# Patient Record
Sex: FEMALE | Race: WHITE | Employment: FULL TIME | URBAN - METROPOLITAN AREA
[De-identification: names, ages, dates, MRNs, and addresses within clinical notes are randomized per-mention and may not be internally consistent; named-entity substitution may affect disease eponyms.]

---

## 2018-12-14 ENCOUNTER — APPOINTMENT (OUTPATIENT)
Dept: ULTRASOUND IMAGING | Facility: CLINIC | Age: 28
End: 2018-12-14
Attending: EMERGENCY MEDICINE
Payer: COMMERCIAL

## 2018-12-14 ENCOUNTER — HOSPITAL ENCOUNTER (EMERGENCY)
Facility: CLINIC | Age: 28
Discharge: HOME | End: 2018-12-14
Attending: EMERGENCY MEDICINE | Admitting: EMERGENCY MEDICINE
Payer: COMMERCIAL

## 2018-12-14 ENCOUNTER — APPOINTMENT (OUTPATIENT)
Dept: CT IMAGING | Facility: CLINIC | Age: 28
End: 2018-12-14
Attending: EMERGENCY MEDICINE
Payer: COMMERCIAL

## 2018-12-14 VITALS
TEMPERATURE: 98 F | SYSTOLIC BLOOD PRESSURE: 123 MMHG | DIASTOLIC BLOOD PRESSURE: 78 MMHG | OXYGEN SATURATION: 100 % | RESPIRATION RATE: 16 BRPM | HEART RATE: 97 BPM | WEIGHT: 284 LBS

## 2018-12-14 DIAGNOSIS — K80.20 CALCULUS OF GALLBLADDER WITHOUT CHOLECYSTITIS WITHOUT OBSTRUCTION: ICD-10-CM

## 2018-12-14 DIAGNOSIS — R10.13 ABDOMINAL PAIN, EPIGASTRIC: ICD-10-CM

## 2018-12-14 LAB
ALBUMIN SERPL-MCNC: 3.5 G/DL (ref 3.4–5)
ALP SERPL-CCNC: 96 U/L (ref 40–150)
ALT SERPL W P-5'-P-CCNC: 33 U/L (ref 0–50)
ANION GAP SERPL CALCULATED.3IONS-SCNC: 6 MMOL/L (ref 3–14)
AST SERPL W P-5'-P-CCNC: 17 U/L (ref 0–45)
BASOPHILS # BLD AUTO: 0.1 10E9/L (ref 0–0.2)
BASOPHILS NFR BLD AUTO: 0.5 %
BILIRUB SERPL-MCNC: 0.2 MG/DL (ref 0.2–1.3)
BUN SERPL-MCNC: 10 MG/DL (ref 7–30)
CALCIUM SERPL-MCNC: 8.9 MG/DL (ref 8.5–10.1)
CHLORIDE SERPL-SCNC: 105 MMOL/L (ref 94–109)
CO2 SERPL-SCNC: 27 MMOL/L (ref 20–32)
CREAT SERPL-MCNC: 0.79 MG/DL (ref 0.52–1.04)
DIFFERENTIAL METHOD BLD: ABNORMAL
EOSINOPHIL # BLD AUTO: 0.1 10E9/L (ref 0–0.7)
EOSINOPHIL NFR BLD AUTO: 0.9 %
ERYTHROCYTE [DISTWIDTH] IN BLOOD BY AUTOMATED COUNT: 13.6 % (ref 10–15)
GFR SERPL CREATININE-BSD FRML MDRD: 86 ML/MIN/1.7M2
GLUCOSE SERPL-MCNC: 88 MG/DL (ref 70–99)
HCG SERPL QL: NEGATIVE
HCT VFR BLD AUTO: 43 % (ref 35–47)
HGB BLD-MCNC: 14.1 G/DL (ref 11.7–15.7)
IMM GRANULOCYTES # BLD: 0 10E9/L (ref 0–0.4)
IMM GRANULOCYTES NFR BLD: 0.3 %
LIPASE SERPL-CCNC: 222 U/L (ref 73–393)
LYMPHOCYTES # BLD AUTO: 3.7 10E9/L (ref 0.8–5.3)
LYMPHOCYTES NFR BLD AUTO: 30.2 %
MCH RBC QN AUTO: 27.9 PG (ref 26.5–33)
MCHC RBC AUTO-ENTMCNC: 32.8 G/DL (ref 31.5–36.5)
MCV RBC AUTO: 85 FL (ref 78–100)
MONOCYTES # BLD AUTO: 0.7 10E9/L (ref 0–1.3)
MONOCYTES NFR BLD AUTO: 5.8 %
NEUTROPHILS # BLD AUTO: 7.6 10E9/L (ref 1.6–8.3)
NEUTROPHILS NFR BLD AUTO: 62.3 %
NRBC # BLD AUTO: 0 10*3/UL
NRBC BLD AUTO-RTO: 0 /100
PLATELET # BLD AUTO: 386 10E9/L (ref 150–450)
POTASSIUM SERPL-SCNC: 3.9 MMOL/L (ref 3.4–5.3)
PROT SERPL-MCNC: 7.7 G/DL (ref 6.8–8.8)
RBC # BLD AUTO: 5.05 10E12/L (ref 3.8–5.2)
SODIUM SERPL-SCNC: 138 MMOL/L (ref 133–144)
WBC # BLD AUTO: 12.2 10E9/L (ref 4–11)

## 2018-12-14 PROCEDURE — 74177 CT ABD & PELVIS W/CONTRAST: CPT

## 2018-12-14 PROCEDURE — 25000128 H RX IP 250 OP 636: Performed by: EMERGENCY MEDICINE

## 2018-12-14 PROCEDURE — 96376 TX/PRO/DX INJ SAME DRUG ADON: CPT

## 2018-12-14 PROCEDURE — 80053 COMPREHEN METABOLIC PANEL: CPT | Performed by: EMERGENCY MEDICINE

## 2018-12-14 PROCEDURE — 96375 TX/PRO/DX INJ NEW DRUG ADDON: CPT

## 2018-12-14 PROCEDURE — 96374 THER/PROPH/DIAG INJ IV PUSH: CPT | Mod: 59

## 2018-12-14 PROCEDURE — 84703 CHORIONIC GONADOTROPIN ASSAY: CPT | Performed by: EMERGENCY MEDICINE

## 2018-12-14 PROCEDURE — 76705 ECHO EXAM OF ABDOMEN: CPT

## 2018-12-14 PROCEDURE — 85025 COMPLETE CBC W/AUTO DIFF WBC: CPT | Performed by: EMERGENCY MEDICINE

## 2018-12-14 PROCEDURE — 99285 EMERGENCY DEPT VISIT HI MDM: CPT | Mod: 25

## 2018-12-14 PROCEDURE — 83690 ASSAY OF LIPASE: CPT | Performed by: EMERGENCY MEDICINE

## 2018-12-14 PROCEDURE — 96361 HYDRATE IV INFUSION ADD-ON: CPT

## 2018-12-14 RX ORDER — SODIUM CHLORIDE 9 MG/ML
1000 INJECTION, SOLUTION INTRAVENOUS CONTINUOUS
Status: DISCONTINUED | OUTPATIENT
Start: 2018-12-14 | End: 2018-12-14 | Stop reason: HOSPADM

## 2018-12-14 RX ORDER — ONDANSETRON 2 MG/ML
4 INJECTION INTRAMUSCULAR; INTRAVENOUS EVERY 30 MIN PRN
Status: DISCONTINUED | OUTPATIENT
Start: 2018-12-14 | End: 2018-12-14 | Stop reason: HOSPADM

## 2018-12-14 RX ORDER — IOPAMIDOL 755 MG/ML
100 INJECTION, SOLUTION INTRAVASCULAR ONCE
Status: COMPLETED | OUTPATIENT
Start: 2018-12-14 | End: 2018-12-14

## 2018-12-14 RX ORDER — HYDROCODONE BITARTRATE AND ACETAMINOPHEN 5; 325 MG/1; MG/1
1 TABLET ORAL EVERY 6 HOURS PRN
Qty: 18 TABLET | Refills: 0 | Status: SHIPPED | OUTPATIENT
Start: 2018-12-14 | End: 2018-12-17

## 2018-12-14 RX ORDER — ONDANSETRON 4 MG/1
4 TABLET, ORALLY DISINTEGRATING ORAL EVERY 6 HOURS PRN
Qty: 10 TABLET | Refills: 0 | Status: SHIPPED | OUTPATIENT
Start: 2018-12-14 | End: 2018-12-17

## 2018-12-14 RX ORDER — HYDROMORPHONE HYDROCHLORIDE 1 MG/ML
0.5 INJECTION, SOLUTION INTRAMUSCULAR; INTRAVENOUS; SUBCUTANEOUS
Status: DISCONTINUED | OUTPATIENT
Start: 2018-12-14 | End: 2018-12-14 | Stop reason: HOSPADM

## 2018-12-14 RX ADMIN — IOPAMIDOL 100 ML: 755 INJECTION, SOLUTION INTRAVENOUS at 12:43

## 2018-12-14 RX ADMIN — SODIUM CHLORIDE 1000 ML: 9 INJECTION, SOLUTION INTRAVENOUS at 10:44

## 2018-12-14 RX ADMIN — SODIUM CHLORIDE, PRESERVATIVE FREE 65 ML: 5 INJECTION INTRAVENOUS at 12:44

## 2018-12-14 RX ADMIN — Medication 0.5 MG: at 11:45

## 2018-12-14 RX ADMIN — Medication 0.5 MG: at 10:46

## 2018-12-14 RX ADMIN — ONDANSETRON 4 MG: 2 INJECTION INTRAMUSCULAR; INTRAVENOUS at 10:46

## 2018-12-14 SDOH — HEALTH STABILITY: MENTAL HEALTH: HOW OFTEN DO YOU HAVE A DRINK CONTAINING ALCOHOL?: NEVER

## 2018-12-14 ASSESSMENT — ENCOUNTER SYMPTOMS
FATIGUE: 1
HEMATURIA: 0
CONSTIPATION: 0
VOMITING: 1
BLOOD IN STOOL: 0
ABDOMINAL PAIN: 1
DYSURIA: 0
DIARRHEA: 0
NAUSEA: 1
DIFFICULTY URINATING: 0

## 2018-12-14 NOTE — ED PROVIDER NOTES
"  History     Chief Complaint:  Abdominal pain, nausea, vomiting    HPI   Nena Ca is a 27 year old female who presents with abdominal pain, nausea, and vomiting. The patient states that for the last 10 years she has had intermittent episodes of abdominal pain and vomiting following eating and these episodes usually last a couple weeks at a time. She has mentioned these issues to her PCP before, and she was told she may have a gastric ulcer, however no formal testing to investigate the issue has never been performed. The patient states that for the last two weeks she has been experiencing a similar episode. She states that it started as feeling nauseated after eating, and then in the last week eating has triggered abdominal pain and vomiting. The pain is usually in the center of her abdomen and has been constant the past two days. The pain does not radiate to her back or chest. She notes that \"heavy\" foods like potatoes bring on the most severe pain. Here in the ED, the pain seems to be on the left side of her abdomen. She reports that she usually starts vomiting 15 minutes after trying to eat and then she vomits several times afterwards for about the next 20 minutes. The patient has noted blood tinged vomit. She reports that she has lost about 20 pounds in the last few weeks because of low food intake and that she feels fatigued as well. The patient denies any constipation, diarrhea, black or bloody stool, urinary issues, or menstrual irregularities. Her last period was two weeks ago. The patient notes that she was told by her PCP that her abdominal pain and vomiting issues have never been charted before. She has never had an EGD performed and has an ultrasound scheduled for next week.     Allergies:  Amoxicillin  Penicillins  Sulfa Drugs  Trimethaphan      Medications:    The patient is currently on no regular medications.    Past Medical History:    The patient denies any significant past medical " history.    Past Surgical History:    The patient does not have any pertinent past surgical history.    Family History:    Cancer    Social History:  Patient presents with friend  Negative for tobacco use.  Negative for alcohol use.     Review of Systems   Constitutional: Positive for fatigue.   Gastrointestinal: Positive for abdominal pain, nausea and vomiting. Negative for blood in stool, constipation and diarrhea.   Genitourinary: Negative for decreased urine volume, difficulty urinating, dysuria, hematuria and menstrual problem.   All other systems reviewed and are negative.      Physical Exam   First Vitals:  BP: (!) 161/102  Pulse: 97  Heart Rate: 94  Temp: 98  F (36.7  C)  Resp: 16  Weight: 128.8 kg (284 lb)  SpO2: 98 %      Physical Exam  Constitutional:  Appears well-developed and well-nourished. Alert. Conversant. Non toxic.  HENT:   Head: Atraumatic.   Nose: Nose normal.  Mouth/Throat: Oral mucosa is clear and moist. no trismus. Pharynx normal. Tonsils symmetric. No tonsillar enlargement, erythema, or exudate.  Eyes: Conjunctivae normal. EOM normal. Pupils equal, round, and reactive to light. No scleral icterus.   Neck: Normal range of motion. Neck supple. No tracheal deviation present.   Cardiovascular: Normal rate, regular rhythm. No gallop. No friction rub. No murmur heard. Symmetric radial artery pulses   Pulmonary/Chest: Effort normal. No stridor. No respiratory distress. No wheezes. No rales. No rhonchi . No tenderness.   Abdominal: Soft. Bowel sounds normal. No distension. No mass.  Epigastric and left upper quadrant> right upper quadrant tenderness. No rebound. No guarding. No CVA tenderness.  No lower abdominal tenderness.  Musculoskeletal:   RUE: Normal range of motion. No tenderness. No deformity  LUE: Normal range of motion. No tenderness. No deformity  RLE: Normal range of motion. No edema. No tenderness. No deformity  LLE: Normal range of motion. No edema. No tenderness. No deformity  Lymph:  No cervical adenopathy.   Neurological: Alert and oriented to person, place, and time. Normal strength. CN II-VII intact. No sensory deficit. GCS eye subscore is 4. GCS verbal subscore is 5. GCS motor subscore is 6. Normal coordination   Skin: Skin is warm and dry. No rash noted. No pallor. Normal capillary refill.  Psychiatric:  Normal mood. Normal affect.     Emergency Department Course   Imaging:  Radiographic findings were communicated with the patient who voiced understanding of the findings.  US Abdomen limited RUQ  Cholelithiasis.  Per radiology    CT Abdomen/Pelvis with IV contrast:   1. No acute inflammatory process identified.   2. Cholelithiasis.  3. Mild splenomegaly.  4. 4.2 cm lower pelvic cystic lesion, which may represent an exophytic  ovarian cyst.  per radiology.     Laboratory:  CBC: WBC: 12.2 (H), HGB: 14.1, PLT: 386  CMP: WNL (Creatinine: 0.79)(Glucose 88)  Lipase: 222  HCG: negative    Interventions:  1044 - NS 1L IV  1046 - Zofran 4 mg IV  1145 - Dilaudid 0.5 mg IV    Emergency Department Course:  Nursing notes and vitals reviewed.  1011: I performed an exam of the patient as documented above. IV inserted and blood drawn. Labs and imaging ordered.    1334: I rechecked the patient. Findings and plan explained to the Patient. Patient discharged home with instructions regarding supportive care, medications, and reasons to return. The importance of close follow-up was reviewed.     Impression & Plan    Medical Decision Making:  Nena Ca is a 27 year old female presented to the Emergency Department with a history of recurrent upper abdominal pain, but in particular severe for the past couple of weeks.  The clinical exam today is non-specific.  The laboratory testing does not reveal a cause for the patient's pain.  Gallbladder imaging shows one gallstone within the gallbladder but no signs of biliary tree stones or gallbladder wall thickening or other ultrasonographic findings to suggest  cholecystitis.  CT imaging shows no acute inflammatory process.  It does show a small cyst in the pelvis which I discussed with the patient and for which she will follow-up with her doctor for repeat ultrasound.  I do not think this pelvic cyst is was causing her pain is at its anatomically nowhere near her discomfort.  She is not having any urinary symptoms to suggest pyelonephritis or kidney stone.  Discussed that it is possible that the gallstone has been causing intermittent biliary colic but with such persistent and significant lasting pain for the past couple of weeks I would almost expect more significant findings of cholecystitis on the ultrasound.  After some discussion we will refer the patient for an outpatient consultation with surgery to consider cholecystectomy as the gallstone may be causing her pain.  The exact etiology of the abdominal pain is not clear at this time.  Pain could also be due to other pathology such as gastritis or peptic ulcer disease.  No evidence for perforated ulcer on CT and no evidence for GI bleeding.  The differential diagnosis of abdominal pain includes: Appendicitis, Bowel Obstruction, Ulcer, Ischemia, Diverticulitis, Pancreatitis, UTI, kidney stone, Enteritis/Colitis, amongst many other etiologies.  No life threatening cause or need for emergent surgery or hospital admission is detected today.  The patient was advised that if symptoms do not completely resolve within another 12-24 hours re-evaluation with primary care or return to the ED is indicated. The pateient also understands that if they worsen, they should return to the ER right away. I discussed the uncertainty about the diagnosis and answered the patient's questions.    Diagnosis:    ICD-10-CM   1. Calculus of gallbladder without cholecystitis without obstruction K80.20   2. Abdominal pain, epigastric R10.13       Disposition:  discharged to home    Discharge Medications:     Medication List      Started     HYDROcodone-acetaminophen 5-325 MG tablet  Commonly known as:  NORCO  1 tablet, Oral, EVERY 6 HOURS PRN     ondansetron 4 MG ODT tab  Commonly known as:  ZOFRAN ODT  4 mg, Oral, EVERY 6 HOURS PRN          Eliot BRADLEY, am serving as a scribe on 12/14/2018 at 10:11 AM to personally document services performed by Daryl Patel MD based on my observations and the provider's statements to me.       Eliot Gonzales  12/14/2018   [facility] EMERGENCY DEPARTMENT       Daryl Patel MD  12/14/18 0824

## 2018-12-14 NOTE — ED TRIAGE NOTES
"Pt presents with generalized abd pain \"for last 10 years but I can't take it anymore\". Was seen at [facility] for it last week and has US scheduled on 12/18. Here today because pain is worse. Intermittent N/V \"with blood sometimes\". ABCs intact.   "

## 2018-12-14 NOTE — DISCHARGE INSTRUCTIONS
Discharge Instructions  Abdominal Pain    Abdominal pain can be caused by many things. Your evaluation today does not show the exact cause for your pain. Your doctor today has decided that it is unlikely your pain is due to a life threatening problem, or a problem requiring surgery or hospital admission. Sometimes those problems cannot be found right away, so it is very important that you follow up as directed.  Sometimes only the changes which occur over time allow the cause of your pain to be found.    Return to the Emergency Department for a recheck in 8-12 hours if your pain continues.  If your pain gets worse, changes in location, or feels different, return to the Emergency Department right away.    ADULTS:  Return to the Emergency Department right away if:    You get an oral temperature above 102oF or as directed by your doctor.  You have blood in your stools (bright red or black, tarry stools).  You keep throwing up or can?t drink liquids.  You see blood when you throw up.  You can?t have a bowel movement or you can?t pass gas.  Your stomach gets bloated or bigger.  Your skin or the whites of your eyes look yellow.  You faint.  You have bloody, frequent or painful urination.  You have new symptoms or anything that worries you.      MORE INFORMATION:    Appendicitis:  A possible cause of abdominal pain in any person who still has their appendix is acute appendicitis. Appendicitis is often hard to diagnose.  Testing does not always rule out early appendicitis or other causes of abdominal pain. Close follow-up with your doctor and re-evaluations may be needed to figure out the reason for your abdominal pain.    Follow-up:  It is very important that you make an appointment with your clinic and go to the appointment.  If you do not follow-up with your primary doctor, it may result in missing an important development which could result in permanent injury or disability and/or lasting pain.  If there is any problem  "keeping your appointment, call your doctor or return to the Emergency Department.    Medications:  Take your medications as directed by your doctor today.  Before using over-the-counter medications, ask your doctor and make sure to take the medications as directed.  If you have any questions about medications, ask your doctor.    Diet:  Resume your normal diet as much as possible, but do not eat fried, fatty or spicy foods while you have pain.  Do not drink alcohol or have caffeine.  Do not smoke tobacco.    Probiotics: If you have been given an antibiotic, you may want to also take a probiotic pill or eat yogurt with live cultures. Probiotics have \"good bacteria\" to help your intestines stay healthy. Studies have shown that probiotics help prevent diarrhea and other intestine problems (including C. diff infection) when you take antibiotics. You can buy these without a prescription in the pharmacy section of the store.     If you were given a prescription for medicine here today, be sure to read all of the information (including the package insert) that comes with your prescription.  This will include important information about the medicine, its side effects, and any warnings that you need to know about.  The pharmacist who fills the prescription can provide more information and answer questions you may have about the medicine.  If you have questions or concerns that the pharmacist cannot address, please call or return to the Emergency Department.         Opioid Medication Information    Pain medications are among the most commonly prescribed medicines, so we are including this information for all our patients. If you did not receive pain medication or get a prescription for pain medicine, you can ignore it.     You may have been given a prescription for an opioid (narcotic) pain medicine and/or have received a pain medicine while here in the Emergency Department. These medicines can make you drowsy or impaired. " You must not drive, operate dangerous equipment, or engage in any other dangerous activities while taking these medications. If you drive while taking these medications, you could be arrested for DUI, or driving under the influence. Do not drink any alcohol while you are taking these medications.     Opioid pain medications can cause addiction. If you have a history of chemical dependency of any type, you are at a higher risk of becoming addicted to pain medications.  Only take these prescribed medications to treat your pain when all other options have been tried. Take it for as short a time and as few doses as possible. Store your pain pills in a secure place, as they are frequently stolen and provide a dangerous opportunity for children or visitors in your house to start abusing these powerful medications. We will not replace any lost or stolen medicine.  As soon as your pain is better, you should flush all your remaining medication.     Many prescription pain medications contain Tylenol  (acetaminophen), including Vicodin , Tylenol #3 , Norco , Lortab , and Percocet .  You should not take any extra pills of Tylenol  if you are using these prescription medications or you can get very sick.  Do not ever take more than 3000 mg of acetaminophen in any 24 hour period.    All opioids tend to cause constipation. Drink plenty of water and eat foods that have a lot of fiber, such as fruits, vegetables, prune juice, apple juice and high fiber cereal.  Take a laxative if you don?t move your bowels at least every other day. Miralax , Milk of Magnesia, Colace , or Senna  can be used to keep you regular.      Remember that you can always come back to the Emergency Department if you are not able to see your regular doctor in the amount of time listed above, if you get any new symptoms, or if there is anything that worries you.

## 2018-12-14 NOTE — ED AVS SNAPSHOT
[facility] Emergency Department  [address]  [facility] [address]  Phone:  [phone]  Fax:  [phone]                                    Nena Ca   MRN: [id number]    Department:  [facility] Emergency Department   Date of Visit:  12/14/2018           After Visit Summary Signature Page    I have received my discharge instructions, and my questions have been answered. I have discussed any challenges I see with this plan with the nurse or doctor.    ..........................................................................................................................................  Patient/Patient Representative Signature      ..........................................................................................................................................  Patient Representative Print Name and Relationship to Patient    ..................................................               ................................................  Date                                   Time    ..........................................................................................................................................  Reviewed by Signature/Title    ...................................................              ..............................................  Date                                               Time          22EPIC Rev 08/18

## 2018-12-24 ENCOUNTER — OFFICE VISIT (OUTPATIENT)
Dept: SURGERY | Facility: CLINIC | Age: 28
End: 2018-12-24
Payer: COMMERCIAL

## 2018-12-24 VITALS
RESPIRATION RATE: 16 BRPM | DIASTOLIC BLOOD PRESSURE: 78 MMHG | HEART RATE: 92 BPM | BODY MASS INDEX: 40.66 KG/M2 | SYSTOLIC BLOOD PRESSURE: 118 MMHG | WEIGHT: 284 LBS | OXYGEN SATURATION: 97 % | HEIGHT: 70 IN

## 2018-12-24 DIAGNOSIS — E66.01 MORBID OBESITY (H): ICD-10-CM

## 2018-12-24 DIAGNOSIS — K80.20 GALLSTONES: Primary | ICD-10-CM

## 2018-12-24 PROCEDURE — 99243 OFF/OP CNSLTJ NEW/EST LOW 30: CPT | Performed by: SURGERY

## 2018-12-24 RX ORDER — ONDANSETRON 4 MG/1
4 TABLET, FILM COATED ORAL EVERY 8 HOURS PRN
COMMUNITY

## 2018-12-24 RX ORDER — HYDROCODONE BITARTRATE AND ACETAMINOPHEN 10; 325 MG/1; MG/1
1 TABLET ORAL EVERY 4 HOURS PRN
COMMUNITY

## 2018-12-24 ASSESSMENT — ENCOUNTER SYMPTOMS
APPETITE CHANGE: 1
VOMITING: 1
WEIGHT LOSS: 1
ABDOMINAL PAIN: 1

## 2018-12-24 ASSESSMENT — MIFFLIN-ST. JEOR: SCORE: 2099.5

## 2018-12-24 NOTE — PROGRESS NOTES
Chief complaint:  Abdominal pain, epigastric    HPI:  I have been asked by Dr. Darby Hardy to evaluate this patient for long-standing upper abdominal symptoms.  This patient is a 27 year old female who presents with recent worsening of upper abdominal pain.  The patient has had intermittent abdominal pain after eating, often associated with nausea and vomiting, since she was in high school.  She recently had a severe episode which brought her to the emergency room.  She thought there was probably also some blood tinge in the emesis.  Eating makes her pain much worse.  This seems to occur mostly with heavy foods.  Her pain is now almost constant for the last 2 weeks.    Past Medical History:  The patient denies any significant medical history.    Past Surgical History:  Ankle surgery     Social History:  Social History     Socioeconomic History     Marital status: Single     Spouse name: Not on file     Number of children: Not on file     Years of education: Not on file     Highest education level: Not on file   Social Needs     Financial resource strain: Not on file     Food insecurity - worry: Not on file     Food insecurity - inability: Not on file     Transportation needs - medical: Not on file     Transportation needs - non-medical: Not on file   Occupational History     Not on file   Tobacco Use     Smoking status: Never Smoker     Smokeless tobacco: Never Used   Substance and Sexual Activity     Alcohol use: No     Frequency: Never     Drug use: Not on file     Sexual activity: Not on file   Other Topics Concern     Not on file   Social History Narrative     Not on file        Family History:  Family History   Problem Relation Age of Onset     Hypertension Father      Diabetes Maternal Grandmother      Diabetes Paternal Grandfather    Cancer    Review of Systems:  The 10 point Review of Systems is negative other than noted in the HPI and above.    Physical Exam:  General - This is a well developed, well  nourished female in no apparent distress.  HEENT - Normocephalic, atraumatic.  No scleral icterus.  Neck - supple without masses  Lungs - clear to ascultation.    Heart - Regular rate & rhythm without murmur  Abdomen:   soft, obese with tenderness noted in the right upper quadrant . no masses palpated. normal bowel sounds.  Extremities - warm without edema  Neurologic - nonfocal    Relevant labs:  Normal liver function tests  White blood cell count 12,600.    Imaging:  CT and ultrasound showed cholelithiasis.  CT scan also showed a cystic lesion in the pelvis, consistent with ovarian cyst.  Follow-up was recommended.    Assessment and Plan:  This is a patient with gallstones and fairly chronic upper abdominal pain.  I explained that her gallstone has certainly been present for a good while, and that it is very possible that some or all of her symptoms are unrelated to her gallbladder.  Nonetheless, she does have right upper quadrant tenderness and a gallstone.  I have recommended Laparoscopic cholecystectomy.  We discussed the procedure, along with its risks and complications, in detail.  We discussed the possibility of nonresolution of her symptoms.  We will work on scheduling surgery at the patient s convenience.  The patient informs me that for follow-up regarding her ovarian cyst is being arranged through her primary care office.  I have suggested that she should probably see a gynecologist at some point.    Dariel Shankar MD  [facility]    Please route or send letter to:  Primary Care Provider (PCP)

## 2018-12-24 NOTE — PROGRESS NOTES
HPI      ROS (Review of Systems):      Positive for weight loss and appetite change.   GASTROINTESTINAL: Positive for vomiting and abdominal pain.          Physical Exam

## 2018-12-26 ENCOUNTER — TELEPHONE (OUTPATIENT)
Dept: SURGERY | Facility: CLINIC | Age: 28
End: 2018-12-26

## 2018-12-26 RX ORDER — CEFAZOLIN SODIUM 1 G/3ML
1 INJECTION, POWDER, FOR SOLUTION INTRAMUSCULAR; INTRAVENOUS SEE ADMIN INSTRUCTIONS
Status: CANCELLED | OUTPATIENT
Start: 2018-12-26

## 2018-12-26 RX ORDER — CEFAZOLIN SODIUM IN 0.9 % NACL 3 G/100 ML
3 INTRAVENOUS SOLUTION, PIGGYBACK (ML) INTRAVENOUS
Status: CANCELLED | OUTPATIENT
Start: 2018-12-26

## 2018-12-26 NOTE — TELEPHONE ENCOUNTER
Type of surgery: LAPAROSCOPIC CHOLECYSTECTOMY   Location of surgery: [facility]  Date and time of surgery: 1-8-19 AT 7:30 AM   Surgeon: DR. TOMPKINS   Pre-Op Appt Date: 12/14/18   Post-Op Appt Date: PATIENT TO SCHEDULE    Packet sent out: GIVEN TO PATIENT   Pre-cert/Authorization completed:  Not Applicable  Date: 12-26-18         LAPAROSCOPIC CHOLECYSTECTOMY   GENERAL  H&P DONE [facility] ER 12/14/18   75 MINS REQ  PA ASSIST DFB   ALW

## 2019-01-03 ASSESSMENT — MIFFLIN-ST. JEOR: SCORE: 2094.5

## 2019-01-08 ENCOUNTER — APPOINTMENT (OUTPATIENT)
Dept: SURGERY | Facility: PHYSICIAN GROUP | Age: 29
End: 2019-01-08
Payer: COMMERCIAL

## 2019-01-08 ENCOUNTER — ANESTHESIA EVENT (OUTPATIENT)
Dept: SURGERY | Facility: CLINIC | Age: 29
End: 2019-01-08
Payer: COMMERCIAL

## 2019-01-08 ENCOUNTER — HOSPITAL ENCOUNTER (OUTPATIENT)
Facility: CLINIC | Age: 29
Discharge: HOME | End: 2019-01-08
Attending: SURGERY | Admitting: SURGERY
Payer: COMMERCIAL

## 2019-01-08 ENCOUNTER — ANESTHESIA (OUTPATIENT)
Dept: SURGERY | Facility: CLINIC | Age: 29
End: 2019-01-08
Payer: COMMERCIAL

## 2019-01-08 VITALS
DIASTOLIC BLOOD PRESSURE: 90 MMHG | RESPIRATION RATE: 12 BRPM | BODY MASS INDEX: 40.8 KG/M2 | WEIGHT: 285 LBS | TEMPERATURE: 97.6 F | HEART RATE: 78 BPM | HEIGHT: 70 IN | OXYGEN SATURATION: 98 % | SYSTOLIC BLOOD PRESSURE: 136 MMHG

## 2019-01-08 DIAGNOSIS — K80.20 GALLSTONES: Primary | ICD-10-CM

## 2019-01-08 LAB — HCG UR QL: NEGATIVE

## 2019-01-08 PROCEDURE — 27210794 ZZH OR GENERAL SUPPLY STERILE: Performed by: SURGERY

## 2019-01-08 PROCEDURE — 36000056 ZZH SURGERY LEVEL 3 1ST 30 MIN: Performed by: SURGERY

## 2019-01-08 PROCEDURE — 40000306 ZZH STATISTIC PRE PROC ASSESS II: Performed by: SURGERY

## 2019-01-08 PROCEDURE — 71000027 ZZH RECOVERY PHASE 2 EACH 15 MINS: Performed by: SURGERY

## 2019-01-08 PROCEDURE — 25000128 H RX IP 250 OP 636: Performed by: NURSE ANESTHETIST, CERTIFIED REGISTERED

## 2019-01-08 PROCEDURE — 81025 URINE PREGNANCY TEST: CPT | Performed by: ANESTHESIOLOGY

## 2019-01-08 PROCEDURE — 25000125 ZZHC RX 250: Performed by: NURSE ANESTHETIST, CERTIFIED REGISTERED

## 2019-01-08 PROCEDURE — 25800025 ZZH RX 258: Performed by: SURGERY

## 2019-01-08 PROCEDURE — 25000128 H RX IP 250 OP 636: Performed by: ANESTHESIOLOGY

## 2019-01-08 PROCEDURE — 71000012 ZZH RECOVERY PHASE 1 LEVEL 1 FIRST HR: Performed by: SURGERY

## 2019-01-08 PROCEDURE — 25000125 ZZHC RX 250: Performed by: SURGERY

## 2019-01-08 PROCEDURE — 25000125 ZZHC RX 250: Performed by: ANESTHESIOLOGY

## 2019-01-08 PROCEDURE — 88304 TISSUE EXAM BY PATHOLOGIST: CPT | Mod: 26 | Performed by: SURGERY

## 2019-01-08 PROCEDURE — 71000013 ZZH RECOVERY PHASE 1 LEVEL 1 EA ADDTL HR: Performed by: SURGERY

## 2019-01-08 PROCEDURE — 25000132 ZZH RX MED GY IP 250 OP 250 PS 637: Performed by: SURGERY

## 2019-01-08 PROCEDURE — 36000058 ZZH SURGERY LEVEL 3 EA 15 ADDTL MIN: Performed by: SURGERY

## 2019-01-08 PROCEDURE — 25000128 H RX IP 250 OP 636: Performed by: PHYSICIAN ASSISTANT

## 2019-01-08 PROCEDURE — 47562 LAPAROSCOPIC CHOLECYSTECTOMY: CPT | Performed by: SURGERY

## 2019-01-08 PROCEDURE — 47562 LAPAROSCOPIC CHOLECYSTECTOMY: CPT | Mod: AS | Performed by: PHYSICIAN ASSISTANT

## 2019-01-08 PROCEDURE — 37000009 ZZH ANESTHESIA TECHNICAL FEE, EACH ADDTL 15 MIN: Performed by: SURGERY

## 2019-01-08 PROCEDURE — 88304 TISSUE EXAM BY PATHOLOGIST: CPT | Performed by: SURGERY

## 2019-01-08 PROCEDURE — 37000008 ZZH ANESTHESIA TECHNICAL FEE, 1ST 30 MIN: Performed by: SURGERY

## 2019-01-08 RX ORDER — ONDANSETRON 4 MG/1
4 TABLET, ORALLY DISINTEGRATING ORAL EVERY 30 MIN PRN
Status: DISCONTINUED | OUTPATIENT
Start: 2019-01-08 | End: 2019-01-08 | Stop reason: HOSPADM

## 2019-01-08 RX ORDER — ONDANSETRON 2 MG/ML
4 INJECTION INTRAMUSCULAR; INTRAVENOUS EVERY 30 MIN PRN
Status: DISCONTINUED | OUTPATIENT
Start: 2019-01-08 | End: 2019-01-08 | Stop reason: HOSPADM

## 2019-01-08 RX ORDER — HYDROMORPHONE HYDROCHLORIDE 1 MG/ML
.3-.5 INJECTION, SOLUTION INTRAMUSCULAR; INTRAVENOUS; SUBCUTANEOUS EVERY 10 MIN PRN
Status: DISCONTINUED | OUTPATIENT
Start: 2019-01-08 | End: 2019-01-08 | Stop reason: HOSPADM

## 2019-01-08 RX ORDER — PROPOFOL 10 MG/ML
INJECTION, EMULSION INTRAVENOUS PRN
Status: DISCONTINUED | OUTPATIENT
Start: 2019-01-08 | End: 2019-01-08

## 2019-01-08 RX ORDER — KETOROLAC TROMETHAMINE 30 MG/ML
INJECTION, SOLUTION INTRAMUSCULAR; INTRAVENOUS PRN
Status: DISCONTINUED | OUTPATIENT
Start: 2019-01-08 | End: 2019-01-08

## 2019-01-08 RX ORDER — ESMOLOL HYDROCHLORIDE 10 MG/ML
INJECTION INTRAVENOUS PRN
Status: DISCONTINUED | OUTPATIENT
Start: 2019-01-08 | End: 2019-01-08

## 2019-01-08 RX ORDER — LIDOCAINE HYDROCHLORIDE 10 MG/ML
INJECTION, SOLUTION INFILTRATION; PERINEURAL PRN
Status: DISCONTINUED | OUTPATIENT
Start: 2019-01-08 | End: 2019-01-08

## 2019-01-08 RX ORDER — PROMETHAZINE HYDROCHLORIDE 25 MG/ML
25 INJECTION INTRAMUSCULAR; INTRAVENOUS
Status: COMPLETED | OUTPATIENT
Start: 2019-01-08 | End: 2019-01-08

## 2019-01-08 RX ORDER — SODIUM CHLORIDE, SODIUM LACTATE, POTASSIUM CHLORIDE, CALCIUM CHLORIDE 600; 310; 30; 20 MG/100ML; MG/100ML; MG/100ML; MG/100ML
INJECTION, SOLUTION INTRAVENOUS CONTINUOUS
Status: DISCONTINUED | OUTPATIENT
Start: 2019-01-08 | End: 2019-01-08 | Stop reason: HOSPADM

## 2019-01-08 RX ORDER — MEPERIDINE HYDROCHLORIDE 50 MG/ML
12.5 INJECTION INTRAMUSCULAR; INTRAVENOUS; SUBCUTANEOUS
Status: DISCONTINUED | OUTPATIENT
Start: 2019-01-08 | End: 2019-01-08 | Stop reason: HOSPADM

## 2019-01-08 RX ORDER — FENTANYL CITRATE 50 UG/ML
25-50 INJECTION, SOLUTION INTRAMUSCULAR; INTRAVENOUS
Status: DISCONTINUED | OUTPATIENT
Start: 2019-01-08 | End: 2019-01-08 | Stop reason: HOSPADM

## 2019-01-08 RX ORDER — DEXAMETHASONE SODIUM PHOSPHATE 4 MG/ML
INJECTION, SOLUTION INTRA-ARTICULAR; INTRALESIONAL; INTRAMUSCULAR; INTRAVENOUS; SOFT TISSUE PRN
Status: DISCONTINUED | OUTPATIENT
Start: 2019-01-08 | End: 2019-01-08

## 2019-01-08 RX ORDER — LEVOFLOXACIN 5 MG/ML
500 INJECTION, SOLUTION INTRAVENOUS
Status: COMPLETED | OUTPATIENT
Start: 2019-01-08 | End: 2019-01-08

## 2019-01-08 RX ORDER — BUPIVACAINE HYDROCHLORIDE AND EPINEPHRINE 5; 5 MG/ML; UG/ML
INJECTION, SOLUTION PERINEURAL PRN
Status: DISCONTINUED | OUTPATIENT
Start: 2019-01-08 | End: 2019-01-08 | Stop reason: HOSPADM

## 2019-01-08 RX ORDER — OXYCODONE HYDROCHLORIDE 5 MG/1
5 TABLET ORAL
Status: COMPLETED | OUTPATIENT
Start: 2019-01-08 | End: 2019-01-08

## 2019-01-08 RX ORDER — FENTANYL CITRATE 50 UG/ML
INJECTION, SOLUTION INTRAMUSCULAR; INTRAVENOUS PRN
Status: DISCONTINUED | OUTPATIENT
Start: 2019-01-08 | End: 2019-01-08

## 2019-01-08 RX ORDER — ONDANSETRON 2 MG/ML
INJECTION INTRAMUSCULAR; INTRAVENOUS PRN
Status: DISCONTINUED | OUTPATIENT
Start: 2019-01-08 | End: 2019-01-08

## 2019-01-08 RX ORDER — PROPOFOL 10 MG/ML
INJECTION, EMULSION INTRAVENOUS CONTINUOUS PRN
Status: DISCONTINUED | OUTPATIENT
Start: 2019-01-08 | End: 2019-01-08

## 2019-01-08 RX ORDER — GLYCOPYRROLATE 0.2 MG/ML
INJECTION, SOLUTION INTRAMUSCULAR; INTRAVENOUS PRN
Status: DISCONTINUED | OUTPATIENT
Start: 2019-01-08 | End: 2019-01-08

## 2019-01-08 RX ORDER — LIDOCAINE 40 MG/G
CREAM TOPICAL
Status: DISCONTINUED | OUTPATIENT
Start: 2019-01-08 | End: 2019-01-08 | Stop reason: HOSPADM

## 2019-01-08 RX ORDER — EPHEDRINE SULFATE 50 MG/ML
25 INJECTION, SOLUTION INTRAVENOUS
Status: COMPLETED | OUTPATIENT
Start: 2019-01-08 | End: 2019-01-08

## 2019-01-08 RX ORDER — OXYCODONE HYDROCHLORIDE 5 MG/1
5-10 TABLET ORAL EVERY 4 HOURS PRN
Qty: 20 TABLET | Refills: 0 | Status: SHIPPED | OUTPATIENT
Start: 2019-01-08 | End: 2019-01-11

## 2019-01-08 RX ORDER — NEOSTIGMINE METHYLSULFATE 1 MG/ML
VIAL (ML) INJECTION PRN
Status: DISCONTINUED | OUTPATIENT
Start: 2019-01-08 | End: 2019-01-08

## 2019-01-08 RX ORDER — NALOXONE HYDROCHLORIDE 0.4 MG/ML
.1-.4 INJECTION, SOLUTION INTRAMUSCULAR; INTRAVENOUS; SUBCUTANEOUS
Status: DISCONTINUED | OUTPATIENT
Start: 2019-01-08 | End: 2019-01-08 | Stop reason: HOSPADM

## 2019-01-08 RX ADMIN — ESMOLOL HYDROCHLORIDE 10 MG: 10 INJECTION, SOLUTION INTRAVENOUS at 08:34

## 2019-01-08 RX ADMIN — DEXAMETHASONE SODIUM PHOSPHATE 4 MG: 4 INJECTION, SOLUTION INTRA-ARTICULAR; INTRALESIONAL; INTRAMUSCULAR; INTRAVENOUS; SOFT TISSUE at 07:52

## 2019-01-08 RX ADMIN — HYDROMORPHONE HYDROCHLORIDE 0.5 MG: 1 INJECTION, SOLUTION INTRAMUSCULAR; INTRAVENOUS; SUBCUTANEOUS at 08:53

## 2019-01-08 RX ADMIN — GLYCOPYRROLATE 0.2 MG: 0.2 INJECTION, SOLUTION INTRAMUSCULAR; INTRAVENOUS at 07:39

## 2019-01-08 RX ADMIN — Medication 5 MG: at 08:40

## 2019-01-08 RX ADMIN — ONDANSETRON 4 MG: 2 INJECTION INTRAMUSCULAR; INTRAVENOUS at 08:27

## 2019-01-08 RX ADMIN — ROCURONIUM BROMIDE 40 MG: 10 INJECTION INTRAVENOUS at 07:39

## 2019-01-08 RX ADMIN — KETOROLAC TROMETHAMINE 30 MG: 30 INJECTION, SOLUTION INTRAMUSCULAR at 08:32

## 2019-01-08 RX ADMIN — MIDAZOLAM 2 MG: 1 INJECTION INTRAMUSCULAR; INTRAVENOUS at 07:34

## 2019-01-08 RX ADMIN — FENTANYL CITRATE 100 MCG: 50 INJECTION, SOLUTION INTRAMUSCULAR; INTRAVENOUS at 07:39

## 2019-01-08 RX ADMIN — ROCURONIUM BROMIDE 10 MG: 10 INJECTION INTRAVENOUS at 08:06

## 2019-01-08 RX ADMIN — FENTANYL CITRATE 50 MCG: 50 INJECTION, SOLUTION INTRAMUSCULAR; INTRAVENOUS at 08:51

## 2019-01-08 RX ADMIN — HYDROMORPHONE HYDROCHLORIDE 0.5 MG: 1 INJECTION, SOLUTION INTRAMUSCULAR; INTRAVENOUS; SUBCUTANEOUS at 08:01

## 2019-01-08 RX ADMIN — PROPOFOL 200 MG: 10 INJECTION, EMULSION INTRAVENOUS at 07:39

## 2019-01-08 RX ADMIN — FENTANYL CITRATE 50 MCG: 50 INJECTION, SOLUTION INTRAMUSCULAR; INTRAVENOUS at 09:26

## 2019-01-08 RX ADMIN — Medication 0.5 MG: at 09:28

## 2019-01-08 RX ADMIN — LIDOCAINE HYDROCHLORIDE 50 MG: 10 INJECTION, SOLUTION INFILTRATION; PERINEURAL at 07:39

## 2019-01-08 RX ADMIN — FENTANYL CITRATE 50 MCG: 50 INJECTION, SOLUTION INTRAMUSCULAR; INTRAVENOUS at 09:09

## 2019-01-08 RX ADMIN — GLYCOPYRROLATE 0.8 MG: 0.2 INJECTION, SOLUTION INTRAMUSCULAR; INTRAVENOUS at 08:39

## 2019-01-08 RX ADMIN — SODIUM CHLORIDE, POTASSIUM CHLORIDE, SODIUM LACTATE AND CALCIUM CHLORIDE: 600; 310; 30; 20 INJECTION, SOLUTION INTRAVENOUS at 10:53

## 2019-01-08 RX ADMIN — ONDANSETRON 4 MG: 2 INJECTION INTRAMUSCULAR; INTRAVENOUS at 10:50

## 2019-01-08 RX ADMIN — EPHEDRINE SULFATE 25 MG: 50 INJECTION, SOLUTION INTRAVENOUS at 12:09

## 2019-01-08 RX ADMIN — LEVOFLOXACIN 500 MG: 5 INJECTION, SOLUTION INTRAVENOUS at 07:45

## 2019-01-08 RX ADMIN — PROPOFOL 50 MCG/KG/MIN: 10 INJECTION, EMULSION INTRAVENOUS at 07:47

## 2019-01-08 RX ADMIN — DEXAMETHASONE SODIUM PHOSPHATE 4 MG: 4 INJECTION, SOLUTION INTRA-ARTICULAR; INTRALESIONAL; INTRAMUSCULAR; INTRAVENOUS; SOFT TISSUE at 07:39

## 2019-01-08 RX ADMIN — PROMETHAZINE HYDROCHLORIDE 25 MG: 25 INJECTION INTRAMUSCULAR; INTRAVENOUS at 12:09

## 2019-01-08 RX ADMIN — FENTANYL CITRATE 50 MCG: 50 INJECTION, SOLUTION INTRAMUSCULAR; INTRAVENOUS at 09:47

## 2019-01-08 RX ADMIN — FENTANYL CITRATE 50 MCG: 50 INJECTION, SOLUTION INTRAMUSCULAR; INTRAVENOUS at 08:55

## 2019-01-08 RX ADMIN — OXYCODONE HYDROCHLORIDE 5 MG: 5 TABLET ORAL at 11:50

## 2019-01-08 RX ADMIN — SODIUM CHLORIDE, POTASSIUM CHLORIDE, SODIUM LACTATE AND CALCIUM CHLORIDE: 600; 310; 30; 20 INJECTION, SOLUTION INTRAVENOUS at 07:34

## 2019-01-08 RX ADMIN — SODIUM CHLORIDE, POTASSIUM CHLORIDE, SODIUM LACTATE AND CALCIUM CHLORIDE: 600; 310; 30; 20 INJECTION, SOLUTION INTRAVENOUS at 08:05

## 2019-01-08 ASSESSMENT — COPD QUESTIONNAIRES: COPD: 0

## 2019-01-08 ASSESSMENT — MIFFLIN-ST. JEOR: SCORE: 2095.06

## 2019-01-08 NOTE — ANESTHESIA PREPROCEDURE EVALUATION
Anesthesia Pre-Procedure Evaluation    Patient: Nena Ca   MRN: [id number] : 1990          Preoperative Diagnosis: GALL STONE    Procedure(s):  LAPAROSCOPIC CHOLECYSTECTOMY    Past Medical History:   Diagnosis Date     PONV (postoperative nausea and vomiting)      History reviewed. No pertinent surgical history.  Anesthesia Evaluation     . Pt has had prior anesthetic. Type: General    History of anesthetic complications   - PONV        ROS/MED HX    ENT/Pulmonary:      (-) COPD, sleep apnea and GREER risk factors   Neurologic:      (-) Other neuro hx and Dementia   Cardiovascular:        (-) hypertension, CAD, pulmonary hypertension and dyslipidemia   METS/Exercise Tolerance:     Hematologic:        (-) anemia   Musculoskeletal:        (-) arthritis   GI/Hepatic:     (+) cholecystitis/cholelithiasis,       Renal/Genitourinary:      (-) renal disease   Endo:     (+) Obesity, .   (-) Type I DM, Type II DM, thyroid disease, chronic steroid usage and other endocrine disorder   Psychiatric:        (-) psychiatric history   Infectious Disease:  - neg infectious disease ROS       Malignancy:      - no malignancy   Other:    - neg other ROS                      Physical Exam      Airway   Mallampati: II  TM distance: >3 FB  Neck ROM: full    Dental     Cardiovascular   Rhythm and rate: regular and normal  (+) murmur       Pulmonary    breath sounds clear to auscultation    Other findings: Lab Test        18                       0955          WBC          12.2*         HGB          14.1          MCV          85            PLT          386            Lab Test        18                       0955          NA           138           POTASSIUM    3.9           CHLORIDE     105           CO2          27            BUN          10            CR           0.79          ANIONGAP     6             BRENNAN          8.9           GLC          88                  Lab Results   Component Value Date    WBC 12.2  "(H) 12/14/2018    HGB 14.1 12/14/2018    HCT 43.0 12/14/2018     12/14/2018     12/14/2018    POTASSIUM 3.9 12/14/2018    CHLORIDE 105 12/14/2018    CO2 27 12/14/2018    BUN 10 12/14/2018    CR 0.79 12/14/2018    GLC 88 12/14/2018    BRENNAN 8.9 12/14/2018    ALBUMIN 3.5 12/14/2018    PROTTOTAL 7.7 12/14/2018    ALT 33 12/14/2018    AST 17 12/14/2018    ALKPHOS 96 12/14/2018    BILITOTAL 0.2 12/14/2018    LIPASE 222 12/14/2018    HCGS Negative 12/14/2018       Preop Vitals  BP Readings from Last 3 Encounters:   01/08/19 109/87   12/24/18 118/78   12/14/18 123/78    Pulse Readings from Last 3 Encounters:   01/08/19 91   12/24/18 92   12/14/18 97      Resp Readings from Last 3 Encounters:   01/08/19 16   12/24/18 16   12/14/18 16    SpO2 Readings from Last 3 Encounters:   01/08/19 97%   12/24/18 97%   12/14/18 100%      Temp Readings from Last 1 Encounters:   01/08/19 97.9  F (36.6  C) (Temporal)    Ht Readings from Last 1 Encounters:   01/08/19 1.765 m (5' 9.5\")      Wt Readings from Last 1 Encounters:   01/03/19 128.8 kg (284 lb)    Estimated body mass index is 41.34 kg/m  as calculated from the following:    Height as of this encounter: 1.765 m (5' 9.5\").    Weight as of this encounter: 128.8 kg (284 lb).       Anesthesia Plan      History & Physical Review  History and physical reviewed and following examination; no interval change.    ASA Status:  2 .    NPO Status:  > 8 hours    Plan for General and ETT with Propofol induction. Maintenance will be Balanced.    PONV prophylaxis:  Ondansetron (or other 5HT-3) and Dexamethasone or Solumedrol  Propofol gtt + Sevo + 60% O2 please      Postoperative Care  Postoperative pain management:  IV analgesics and Oral pain medications.      Consents  Anesthetic plan, risks, benefits and alternatives discussed with:  Patient..                 Donn Whipple MD                    .  "

## 2019-01-08 NOTE — ANESTHESIA CARE TRANSFER NOTE
Patient: Nena Ca    Procedure(s):  LAPAROSCOPIC CHOLECYSTECTOMY    Diagnosis: GALL STONE  Diagnosis Additional Information: No value filed.    Anesthesia Type:   General, ETT     Note:  Airway :Face Mask  Patient transferred to:PACU  Comments:   Spontaneous respirations, oral suctioned, bilateral eye opening and hand grasps.  Extubated to FM O2 6lpm.  VSS to PACU.      Vitals: (Last set prior to Anesthesia Care Transfer)    CRNA VITALS  1/8/2019 0821 - 1/8/2019 0859      1/8/2019             NIBP:  140/76    Pulse:  79                Electronically Signed By: WENDY Laguerre CRNA  January 8, 2019  8:59 AM

## 2019-01-08 NOTE — OP NOTE
General Surgery Operative Note    Pre-operative diagnosis:  Gallstones   Post-operative diagnosis:  Same   Procedure: Laparoscopic Cholecystectomy   Surgeon: Dariel Shankar MD   Assistant(s): Corina Kennedy PA-C - the physician assistant was medically necessary to assist in prepping, positioning, camera operation, retraction/exposure and closure of the port site.    Anesthesia: General    Estimated blood loss: 5 cc's   Drains placed: None   Complications:  None   Findings:   Gallbladder without obvious inflammation.     INDICATIONS FOR OPERATION: This is a patient with upper abdominal pain and gallstones.  Laparoscopic cholecystectomy was recommended.  The procedure along with its risks and complications was discussed in detail and the patient agreed to proceed.    DETAILS OF THE OPERATION: After informed consent the patient was taken to the operating room where she underwent satisfactory induction of general anesthesia.  The patient was then sterilely prepped and draped.  A supraumbilical skin incision was made using a skin knife.  The dissection was carried bluntly down to the fascia.  The fascia was opened using electrocautery and the Saenz trocar was then inserted.  Pneumoperitoneum was achieved using CO2 insufflation, and under direct visualization  three  5 mm upper abdominal ports were placed.  The gallbladder was visualized and was grasped. It was pulled up over the liver and the cystic duct was exposed.  The cystic duct was skeletonized, triple clipped and divided.  The cystic artery was likewise triple clipped and divided, along with a posterior branch.  The gallbladder was then dissected away from the liver using electrocautery.  The gallbladder was then placed in an Endo Catch bag and removed through the supraumbilical incision.  The gallbladder fossa was irrigated out.  There was excellent hemostasis and the clips were in good position.  The trocar sites were now infiltrated with half percent  Marcaine with epinephrine.  The trochars were removed under direct visualization.  The supraumbilical fascia was then closed using 0 Vicryl suture.  Skin incisions were closed using 4-0 subcuticular Vicryl followed by Steri-Strips.    The patient was transferred to the recovery room in satisfactory condition.  Sponge and needle counts were correct at the close of the case.      Specimens:   ID Type Source Tests Collected by Time Destination   A : Gallbladder and contents  Tissue Gallbladder and Contents SURGICAL PATHOLOGY EXAM Dariel Shankar MD 1/8/2019  8:23 AM            Dariel Shankar MD

## 2019-01-08 NOTE — ANESTHESIA POSTPROCEDURE EVALUATION
Patient: Nena Ca    Procedure(s):  LAPAROSCOPIC CHOLECYSTECTOMY    Diagnosis:GALL STONE  Diagnosis Additional Information: Pre-operative diagnosis:  Gallstones  Post-operative diagnosis:  Same  Procedure: Laparoscopic Cholecystectomy          Anesthesia Type:  General, ETT    Note:  Anesthesia Post Evaluation    Patient location during evaluation: Phase 2  Patient participation: Able to fully participate in evaluation  Level of consciousness: awake  Pain management: adequate  Airway patency: patent  Cardiovascular status: acceptable  Respiratory status: acceptable  Hydration status: euvolemic  PONV: controlled     Anesthetic complications: None          Last vitals:  Vitals:    01/08/19 1130 01/08/19 1145 01/08/19 1200   BP: 99/89 127/90    Pulse: 94 62    Resp:      Temp:      SpO2: 96% 95% 94%         Electronically Signed By: Donn Whipple MD  January 8, 2019  12:06 PM

## 2019-01-08 NOTE — DISCHARGE INSTRUCTIONS
HOME CARE FOLLOWING LAPAROSCOPIC CHOLECYSTECTOMY  EVA Connelly, DACIA Castellanos R. O Donnell, J. Shaheen    INCISIONAL CARE:  Replace the bandage over your incisions until all drainage stops, or if more comfortable to have in place.  If present, leave the steri-strips (white paper tapes) in place for 14 days after surgery.  If Dermabond (a type of skin glue) is present, leave in place until it wears/flakes off.     BATHING:  Avoid baths for 1 week after surgery.  Showers are okay.  You may wash your hair at any time.  Gently pat your incisions dry after bathing.    ACTIVITY:  Light Activity -- you may immediately be up and about as tolerated.  Driving -- you may drive when comfortable and off narcotic pain medications.  Light Work -- resume when comfortable off pain medications.  (If you can drive, you probably can work.)  Strenuous Work/Activity -- limit lifting to 20 pounds for 2 weeks.  Progressively increase with time.  Active Sports (running, biking, etc.) -- cautiously resume after 2 weeks.    DISCOMFORT:  Use pain medications as prescribed by your surgeon.  Take the pain medication with some food, when possible, to minimize side effects.  Intermittent use of ice packs at the incision sites may help during the first 48 hours.  Expect gradual improvement.  You may experience shoulder pain, which is due to the air placed within your abdomen during the procedure.  This is temporary and usually passes within 2 days.    DIET:  Drink plenty of fluids.  While taking pain medications, increase dietary fiber or add a fiber supplementation like Metamucil or Citrucel to help prevent constipation - a possible side effect of pain medications.  It is not uncommon to experience some bowel changes (loose stools or constipation) after surgery.  Your body has to adapt to you no longer having a gall bladder.  To help minimize this side effect, avoid fatty foods for the first week after surgery.  You may  then slowly increase the amount of fatty foods in your diet.      NAUSEA:  If nauseated from the anesthetic/pain meds; rest in bed, get up cautiously with assistance, and drink clear liquids (juice, tea, broth).    RETURN APPOINTMENT:  Schedule a follow-up visit 2-3 weeks post-op.  Office Phone:  [phone]     CONTACT US IF THE FOLLOWING DEVELOPS:   1. A fever that is above 101     2. If there is a large amount of drainage, bleeding, or swelling.   3. Severe pain that is not relieved by your prescription.   4. Drainage that is thick, cloudy, yellow, green or white.   5. Any other questions not answered by  Frequently Asked Questions  sheet.      FREQUENTLY ASKED QUESTIONS:    Q:  How should my incision look?    A:  Normally your incision will appear slightly swollen with light redness directly along the incision itself as it heals.  It may feel like a bump or ridge as the healing/scarring happens, and over time (3-4 months) this bump or ridge feeling should slowly go away.  In general, clear or pink watery drainage can be normal at first as your incision heals, but should decrease over time.    Q:  How do I know if my incision is infected?  A:  Look at your incision for signs of infection, like redness around the incision spreading to surrounding skin, or drainage of cloudy or foul-smelling drainage.  If you feel warm, check your temperature to see if you are running a fever.    **If any of these things occur, please notify the nurse at our office.  We may need you to come into the office for an incision check.      Q:  How do I take care of my incision?  A:  If you have a dressing in place - Starting the day after surgery, replace the dressing 1-2 times a day until there is no further drainage from the incision.  At that time, a dressing is no longer needed.  Try to minimize tape on the skin if irritation is occurring at the tape sites.  If you have significant irritation from tape on the skin, please call the  office to discuss other method of dressing your incision.    Small pieces of tape called  steri-strips  may be present directly overlying your incision; these may be removed 10 days after surgery unless otherwise specified by your surgeon.  If these tapes start to loosen at the ends, you may trim them back until they fall off or are removed.    A:  If you had  Dermabond  tissue glue used as a dressing (this causes your incision to look shiny with a clear covering over it) - This type of dressing wears off with time and does not require more dressings over the top unless it is draining around the glue as it wears off.  Do not apply ointments or lotions over the incisions until the glue has completely worn off.    Q:  There is a piece of tape or a sticky  lead  still on my skin.  Can I remove this?  A:  Sometimes the sticky  leads  used for monitoring during surgery or for evaluation in the emergency department are not all removed while you are in the hospital.  These sometimes have a tab or metal dot on them.  You can easily remove these on your own, like taking off a band-aid.  If there is a gel substance under the  lead , simply wipe/clean it off with a washcloth or paper towel.      Q:  What can I do to minimize constipation (very hard stools, or lack of stools)?  A:  Stay well hydrated.  Increase your dietary fiber intake or take a fiber supplement -with plenty of water.  Walk around frequently.  You may consider an over-the-counter stool-softener.  Your Pharmacist can assist you with choosing one that is stocked at your pharmacy.  Constipation is also one of the most common side effects of pain medication.  If you are using pain medication, be pro-active and try to PREVENT problems with constipation by taking the steps above BEFORE constipation becomes a problem.    Q:  What do I do if I need more pain medications?  A:  Call the office to receive refills.  Be aware that certain pain meds cannot be called into a  pharmacy and actually require a paper prescription.  A change may be made in your pain med as you progress thru your recovery period or if you have side effects to certain meds.    --Pain meds are NOT refilled after 5pm on weekdays, and NOT AT ALL on the weekends, so please look ahead to prevent problems.      Q:  Why am I having a hard time sleeping now that I am at home?  A:  Many medications you receive while you are in the hospital can impact your sleep for a number of days after your surgery/hospitalization.  Decreased level of activity and naps during the day may also make sleeping at night difficult.  Try to minimize day-time naps, and get up frequently during the day to walk around your home during your recovery time.  Sleep aides may be of some help, but are not recommended for long-term use.      Q:  I am having some back discomfort.  What should I do?  A:  This may be related to certain positioning that was required for your surgery, extended periods of time in bed, or other changes in your overall activity level.  You may try ice, heat, acetaminophen, or ibuprofen to treat this temporarily.  Note that many pain medications have acetaminophen in them and would state this on the prescription bottle.  Be sure not to exceed the maximum of 4000mg per day of acetaminophen.     **If the pain you are having does not resolve, is severe, or is a flare of back pain you have had on other occasions prior to surgery, please contact your primary physician for further recommendations or for an appointment to be examined at their office.    Q:  Why am I having headaches?  A:  Headaches can be caused by many things:  caffeine withdrawal, use of pain meds, dehydration, high blood pressure, lack of sleep, over-activity/exhaustion, flare-up of usual migraine headaches.  If you feel this is related to muscle tension (a band-like feeling around the head, or a pressure at the low-back of the head) you may try ice or heat to  this area.  You may need to drink more fluids (try electrolyte drink like Gatorade), rest, or take your usual migraine medications.   **If your headaches do not resolve, worsen, are accompanied by other symptoms, or if your blood pressure is high, please call your primary physician for recommendation and/or examination.    Q:  I am unable to urinate.  What do I do?  A:  A small percentage of people can have difficulty urinating initially after surgery.  This includes being able to urinate only a very small amount at a time and feeling discomfort or pressure in the very low abdomen.  This is called  urinary retention , and is actually an urgent situation.  Proceed to your nearest Emergency department for evaluation (not an Urgent Care Center).  Sometimes the bladder does not work correctly after certain medications you receive during surgery, or related to certain procedures.  You may need to have a catheter placed until your bladder recovers.  When planning to go to an Emergency department, it may help to call the ER to let them know you are coming in for this problem after a surgery.  This may help you get in quicker to be evaluated.  **If you have symptoms of a urinary tract infection, please contact your primary physician for the proper evaluation and treatment.    If you have other questions, please call the office Monday thru Friday between 8am and 5pm to discuss with the nurse or physician assistant.  #[phone]    There is a surgeon ON CALL on weekday evenings and over the weekend in case of urgent need only, and may be contacted at the same number.    If you are having an emergency, call 911 or proceed to your nearest emergency department.    GENERAL ANESTHESIA OR SEDATION ADULT DISCHARGE INSTRUCTIONS   SPECIAL PRECAUTIONS FOR 24 HOURS AFTER SURGERY    IT IS NOT UNUSUAL TO FEEL LIGHT-HEADED OR FAINT, UP TO 24 HOURS AFTER SURGERY OR WHILE TAKING PAIN MEDICATION.  IF YOU HAVE THESE SYMPTOMS; SIT FOR A FEW  MINUTES BEFORE STANDING AND HAVE SOMEONE ASSIST YOU WHEN YOU GET UP TO WALK OR USE THE BATHROOM.    YOU SHOULD REST AND RELAX FOR THE NEXT 24 HOURS AND YOU MUST MAKE ARRANGEMENTS TO HAVE SOMEONE STAY WITH YOU FOR AT LEAST 24 HOURS AFTER YOUR DISCHARGE.  AVOID HAZARDOUS AND STRENUOUS ACTIVITIES.  DO NOT MAKE IMPORTANT DECISIONS FOR 24 HOURS.    DO NOT DRIVE ANY VEHICLE OR OPERATE MECHANICAL EQUIPMENT FOR 24 HOURS FOLLOWING THE END OF YOUR SURGERY.  EVEN THOUGH YOU MAY FEEL NORMAL, YOUR REACTIONS MAY BE AFFECTED BY THE MEDICATION YOU HAVE RECEIVED.    DO NOT DRINK ALCOHOLIC BEVERAGES FOR 24 HOURS FOLLOWING YOUR SURGERY.    DRINK CLEAR LIQUIDS (APPLE JUICE, GINGER ALE, 7-UP, BROTH, ETC.).  PROGRESS TO YOUR REGULAR DIET AS YOU FEEL ABLE.    YOU MAY HAVE A DRY MOUTH, A SORE THROAT, MUSCLES ACHES OR TROUBLE SLEEPING.  THESE SHOULD GO AWAY AFTER 24 HOURS.    CALL YOUR DOCTOR FOR ANY OF THE FOLLOWING:  SIGNS OF INFECTION (FEVER, GROWING TENDERNESS AT THE SURGERY SITE, A LARGE AMOUNT OF DRAINAGE OR BLEEDING, SEVERE PAIN, FOUL-SMELLING DRAINAGE, REDNESS OR SWELLING.    IT HAS BEEN OVER 8 TO 10 HOURS SINCE SURGERY AND YOU ARE STILL NOT ABLE TO URINATE (PASS WATER).     You received Toradol, an IV form of ibuprofen (Motrin) at 8:30am.  Do not take any ibuprofen products until 2:30pm.

## 2019-01-09 LAB — COPATH REPORT: NORMAL

## 2019-01-11 ENCOUNTER — TELEPHONE (OUTPATIENT)
Dept: SURGERY | Facility: CLINIC | Age: 29
End: 2019-01-11

## 2019-01-11 NOTE — TELEPHONE ENCOUNTER
"Post Surgical Follow Up Call -     \"Hi, my name is Douglas, I'm a registered nurse, and I am calling from [facility] to follow up and see how things are going for you after your recent surgery.\"    Tell me how you are doing now that you are home?\" good      Discharge Instructions    \"Do you have any questions regarding your discharge instructions?\"  no    If applicable:  \"Are you currently taking your post op medication?\"  Only at bedtime     If yes, review dosing schedule with patient.      If applicable:  Discuss any pathology results within normal limits.  Yes: will discuss at post-op    Check EPIC schedule to see if patient has Post Op visit already scheduled.  No (schedule appointment) Patient states she will call on Monday to schedule.        Call Summary      \"If you have questions, we encourage you contact us through the main clinic number (give number).\"      \"Thank you for your time and take care!\"    "

## 2019-01-22 ENCOUNTER — OFFICE VISIT (OUTPATIENT)
Dept: SURGERY | Facility: CLINIC | Age: 29
End: 2019-01-22
Payer: COMMERCIAL

## 2019-01-22 VITALS
HEIGHT: 70 IN | SYSTOLIC BLOOD PRESSURE: 122 MMHG | HEART RATE: 98 BPM | OXYGEN SATURATION: 98 % | BODY MASS INDEX: 40.8 KG/M2 | RESPIRATION RATE: 16 BRPM | DIASTOLIC BLOOD PRESSURE: 80 MMHG | WEIGHT: 285 LBS

## 2019-01-22 DIAGNOSIS — Z09 SURGICAL FOLLOWUP VISIT: Primary | ICD-10-CM

## 2019-01-22 PROCEDURE — 99024 POSTOP FOLLOW-UP VISIT: CPT | Performed by: PHYSICIAN ASSISTANT

## 2019-01-22 ASSESSMENT — MIFFLIN-ST. JEOR: SCORE: 2095.06

## 2019-01-22 NOTE — LETTER
2019    RE: Nena Ca, : 1990    [facility]      Subjective:  Nena Ca is here for her first postoperative visit. She underwent a laparoscopic cholecystectomy by Dr. Shankar on 19. Today she tells me she has been feeling fairly well since surgery. She had one episode of nausea the day after surgery but has not had any nausea since. She currently does not require narcotic pain medications, she is eating a normal diet and her bowels are regular. She has no concerns today.     Objective:  Abd - soft, non-tender, non-distended   Inc - c/d/i, no erythema, +healing ridge, no masses     Assessment:  S/p laparoscopic choleycstectomy. The pathology confirms chronic cholecystitis and cholelithiasis.     Plan:  Recommend Low-fat diet for two more weeks  Call/RTC PRN        Reynaldo Oakes PA-C

## 2019-01-22 NOTE — PROGRESS NOTES
[facility] Note     Subjective:  Nena Ca is here for her first postoperative visit. She underwent a laparoscopic cholecystectomy by Dr. Shankar on 1/8/19. Today she tells me she has been feeling fairly well since surgery. She had one episode of nausea the day after surgery but has not had any nausea since. She currently does not require narcotic pain medications, she is eating a normal diet and her bowels are regular. She has no concerns today.    Objective:  Abd - soft, non-tender, non-distended   Inc - c/d/i, no erythema, +healing ridge, no masses    Assessment:  S/p laparoscopic choleycstectomy. The pathology confirms chronic cholecystitis and cholelithiasis.    Plan:  Recommend Low-fat diet for two more weeks  Call/RTC PRN      Reynaldo Oakes PA-C  1/22/2019      Please route or send letter to:  Primary Care Provider (PCP)

## 2020-08-20 ENCOUNTER — HOSPITAL ENCOUNTER (EMERGENCY)
Facility: CLINIC | Age: 30
Discharge: HOME | End: 2020-08-21
Attending: EMERGENCY MEDICINE | Admitting: EMERGENCY MEDICINE
Payer: COMMERCIAL

## 2020-08-20 DIAGNOSIS — R11.10 VOMITING, INTRACTABILITY OF VOMITING NOT SPECIFIED, PRESENCE OF NAUSEA NOT SPECIFIED, UNSPECIFIED VOMITING TYPE: ICD-10-CM

## 2020-08-20 DIAGNOSIS — R10.13 EPIGASTRIC PAIN: ICD-10-CM

## 2020-08-20 LAB
ALBUMIN SERPL-MCNC: 3.9 G/DL (ref 3.4–5)
ALBUMIN UR-MCNC: 10 MG/DL
ALP SERPL-CCNC: 92 U/L (ref 40–150)
ALT SERPL W P-5'-P-CCNC: 44 U/L (ref 0–50)
ANION GAP SERPL CALCULATED.3IONS-SCNC: 6 MMOL/L (ref 3–14)
APPEARANCE UR: CLEAR
AST SERPL W P-5'-P-CCNC: 20 U/L (ref 0–45)
BACTERIA #/AREA URNS HPF: ABNORMAL /HPF
BASOPHILS # BLD AUTO: 0.1 10E9/L (ref 0–0.2)
BASOPHILS NFR BLD AUTO: 0.5 %
BILIRUB DIRECT SERPL-MCNC: <0.1 MG/DL (ref 0–0.2)
BILIRUB SERPL-MCNC: 0.3 MG/DL (ref 0.2–1.3)
BILIRUB UR QL STRIP: NEGATIVE
BUN SERPL-MCNC: 14 MG/DL (ref 7–30)
CALCIUM SERPL-MCNC: 9.8 MG/DL (ref 8.5–10.1)
CHLORIDE SERPL-SCNC: 103 MMOL/L (ref 94–109)
CO2 SERPL-SCNC: 28 MMOL/L (ref 20–32)
COLOR UR AUTO: YELLOW
CREAT SERPL-MCNC: 0.9 MG/DL (ref 0.52–1.04)
DIFFERENTIAL METHOD BLD: ABNORMAL
EOSINOPHIL # BLD AUTO: 0.3 10E9/L (ref 0–0.7)
EOSINOPHIL NFR BLD AUTO: 2.1 %
ERYTHROCYTE [DISTWIDTH] IN BLOOD BY AUTOMATED COUNT: 13.5 % (ref 10–15)
GFR SERPL CREATININE-BSD FRML MDRD: 86 ML/MIN/{1.73_M2}
GLUCOSE SERPL-MCNC: 86 MG/DL (ref 70–99)
GLUCOSE UR STRIP-MCNC: NEGATIVE MG/DL
HCG UR QL: NEGATIVE
HCT VFR BLD AUTO: 46.8 % (ref 35–47)
HGB BLD-MCNC: 15.1 G/DL (ref 11.7–15.7)
HGB UR QL STRIP: NEGATIVE
IMM GRANULOCYTES # BLD: 0.1 10E9/L (ref 0–0.4)
IMM GRANULOCYTES NFR BLD: 0.4 %
KETONES UR STRIP-MCNC: 40 MG/DL
LEUKOCYTE ESTERASE UR QL STRIP: ABNORMAL
LIPASE SERPL-CCNC: 133 U/L (ref 73–393)
LYMPHOCYTES # BLD AUTO: 4.5 10E9/L (ref 0.8–5.3)
LYMPHOCYTES NFR BLD AUTO: 33.7 %
MCH RBC QN AUTO: 29 PG (ref 26.5–33)
MCHC RBC AUTO-ENTMCNC: 32.3 G/DL (ref 31.5–36.5)
MCV RBC AUTO: 90 FL (ref 78–100)
MONOCYTES # BLD AUTO: 0.9 10E9/L (ref 0–1.3)
MONOCYTES NFR BLD AUTO: 7 %
MUCOUS THREADS #/AREA URNS LPF: PRESENT /LPF
NEUTROPHILS # BLD AUTO: 7.5 10E9/L (ref 1.6–8.3)
NEUTROPHILS NFR BLD AUTO: 56.3 %
NITRATE UR QL: NEGATIVE
NRBC # BLD AUTO: 0 10*3/UL
NRBC BLD AUTO-RTO: 0 /100
PH UR STRIP: 5.5 PH (ref 5–7)
PLATELET # BLD AUTO: 349 10E9/L (ref 150–450)
POTASSIUM SERPL-SCNC: 3.8 MMOL/L (ref 3.4–5.3)
PROT SERPL-MCNC: 8.1 G/DL (ref 6.8–8.8)
RBC # BLD AUTO: 5.21 10E12/L (ref 3.8–5.2)
RBC #/AREA URNS AUTO: 3 /HPF (ref 0–2)
SODIUM SERPL-SCNC: 137 MMOL/L (ref 133–144)
SOURCE: ABNORMAL
SP GR UR STRIP: 1.03 (ref 1–1.03)
SQUAMOUS #/AREA URNS AUTO: 5 /HPF (ref 0–1)
UROBILINOGEN UR STRIP-MCNC: NORMAL MG/DL (ref 0–2)
WBC # BLD AUTO: 13.3 10E9/L (ref 4–11)
WBC #/AREA URNS AUTO: 6 /HPF (ref 0–5)

## 2020-08-20 PROCEDURE — 96361 HYDRATE IV INFUSION ADD-ON: CPT

## 2020-08-20 PROCEDURE — 81001 URINALYSIS AUTO W/SCOPE: CPT | Performed by: EMERGENCY MEDICINE

## 2020-08-20 PROCEDURE — 83690 ASSAY OF LIPASE: CPT | Performed by: EMERGENCY MEDICINE

## 2020-08-20 PROCEDURE — 80048 BASIC METABOLIC PNL TOTAL CA: CPT | Performed by: EMERGENCY MEDICINE

## 2020-08-20 PROCEDURE — 80076 HEPATIC FUNCTION PANEL: CPT | Performed by: EMERGENCY MEDICINE

## 2020-08-20 PROCEDURE — 25800030 ZZH RX IP 258 OP 636: Performed by: EMERGENCY MEDICINE

## 2020-08-20 PROCEDURE — 81025 URINE PREGNANCY TEST: CPT | Performed by: EMERGENCY MEDICINE

## 2020-08-20 PROCEDURE — 99284 EMERGENCY DEPT VISIT MOD MDM: CPT | Mod: 25

## 2020-08-20 PROCEDURE — 85025 COMPLETE CBC W/AUTO DIFF WBC: CPT | Performed by: EMERGENCY MEDICINE

## 2020-08-20 PROCEDURE — 25000128 H RX IP 250 OP 636: Performed by: EMERGENCY MEDICINE

## 2020-08-20 PROCEDURE — 96374 THER/PROPH/DIAG INJ IV PUSH: CPT

## 2020-08-20 RX ORDER — SODIUM CHLORIDE 9 MG/ML
INJECTION, SOLUTION INTRAVENOUS CONTINUOUS
Status: DISCONTINUED | OUTPATIENT
Start: 2020-08-20 | End: 2020-08-21 | Stop reason: HOSPADM

## 2020-08-20 RX ORDER — ONDANSETRON 2 MG/ML
4 INJECTION INTRAMUSCULAR; INTRAVENOUS EVERY 30 MIN PRN
Status: DISCONTINUED | OUTPATIENT
Start: 2020-08-20 | End: 2020-08-21 | Stop reason: HOSPADM

## 2020-08-20 RX ORDER — FAMOTIDINE 20 MG/1
20 TABLET, FILM COATED ORAL 2 TIMES DAILY
Qty: 6 TABLET | Refills: 0 | Status: SHIPPED | OUTPATIENT
Start: 2020-08-20 | End: 2020-08-23

## 2020-08-20 RX ADMIN — ONDANSETRON 4 MG: 2 INJECTION INTRAMUSCULAR; INTRAVENOUS at 22:00

## 2020-08-20 RX ADMIN — SODIUM CHLORIDE 1000 ML: 9 INJECTION, SOLUTION INTRAVENOUS at 21:48

## 2020-08-20 ASSESSMENT — ENCOUNTER SYMPTOMS
NAUSEA: 1
VOMITING: 1
ABDOMINAL PAIN: 1
BLOOD IN STOOL: 0
CONSTIPATION: 0
DIARRHEA: 1

## 2020-08-20 NOTE — ED AVS SNAPSHOT
[facility]  [address]  [facility] [address]  Phone:  [phone]  Fax:  [phone]                                    Nena Ca   MRN: [id number]    Department:  [facility]   Date of Visit:  8/20/2020           After Visit Summary Signature Page    I have received my discharge instructions, and my questions have been answered. I have discussed any challenges I see with this plan with the nurse or doctor.    ..........................................................................................................................................  Patient/Patient Representative Signature      ..........................................................................................................................................  Patient Representative Print Name and Relationship to Patient    ..................................................               ................................................  Date                                   Time    ..........................................................................................................................................  Reviewed by Signature/Title    ...................................................              ..............................................  Date                                               Time          22EPIC Rev 08/18

## 2020-08-21 VITALS
BODY MASS INDEX: 41.48 KG/M2 | WEIGHT: 285 LBS | TEMPERATURE: 98.3 F | RESPIRATION RATE: 18 BRPM | HEART RATE: 85 BPM | SYSTOLIC BLOOD PRESSURE: 90 MMHG | DIASTOLIC BLOOD PRESSURE: 73 MMHG | OXYGEN SATURATION: 99 %

## 2020-08-21 NOTE — ED PROVIDER NOTES
History     Chief Complaint:  Abdominal Pain and Nausea & Vomiting      HPI   Nena Ca is a 29 year old female who presents for evaluation of abdominal pain, nausea, and vomiting. Patient reports having chronic vomiting and states she typically vomits every other meal. For the past week she states she has been vomiting every meal with black emesis. Patient also reports epigastric pain when she eats and diarrhea. She reports taking aspirin. She also was started on doxycycline on 8/10 for an insect bite to cover for possible lyme disease. She denies constipation and dark/tarry/bloody stools.    Allergies:  Amoxcillin   Penicillins   Sulfa drugs   Trimethoprim    Medications:    Bentyl   Celexa   Zofran    Doxycycline    Past Medical History:    Borderline personality disorder   Depression   Anxiety   Calculus of gallbladder   Obesity     Past Surgical History:    Cholecystectomy   Superficial extensor retinaculum release - L    Family History:    Hypertension - father   Early onset Alzheimer's - father     Social History:  The patient was accompanied to the ED by herself.  Smoking Status: Never Smoker, passive smoke exposure  Smokeless Tobacco: Never Used  Alcohol Use: Negative   Marital Status:  Single      Review of Systems   Gastrointestinal: Positive for abdominal pain, diarrhea, nausea and vomiting. Negative for blood in stool and constipation.   All other systems reviewed and are negative.    Physical Exam     Patient Vitals for the past 24 hrs:   BP Temp Temp src Pulse Resp SpO2 Weight   08/20/20 2330 107/68 -- -- 71 -- 96 % --   08/20/20 2315 -- -- -- -- -- 96 % --   08/20/20 2300 114/65 -- -- 66 -- 98 % --   08/20/20 2245 -- -- -- -- -- 96 % --   08/20/20 2230 123/84 -- -- 77 -- 98 % --   08/20/20 2215 -- -- -- -- -- 99 % --   08/20/20 2136 -- -- -- -- -- -- 129.3 kg (285 lb)   08/20/20 1918 (!) 137/101 98.3  F (36.8  C) Oral 105 18 96 % --     Physical Exam  Gen: alert  HEENT: PERRL, oropharynx  clear  Neck: normal ROM  CV: RRR, no murmurs  Pulm: breath sounds equal, lungs clear  Abd: Soft, mild epigastric tenderness  Back: no evidence of injury, no cva tenderness  MSK: no deformity, moves all extremities  Skin: no rash, barely visible erythematous macule right thigh- does not appear to be target lesion  Neuro: alert, appropriate conversation and interaction    Emergency Department Course   Laboratory:  CBC: WBC: 13.3 (H), HGB: 15.1, PLT: 349  BMP: Glucose 86, o/w WNL (Creatinine: 0.90)  Hepatic pane: Negative  UA: Ketones 40, Protein albumin 10, Leukocyte esterase small, WBC 6 (H), RBC 3 (H), Bacteria few, Squamous epithelial 5 (H), Mucous present, o/w Negative  HCG qualitative urine: Negative  Lipase: 133    Interventions:  2148 NS 1000 mL IV  2200 Zofran 4 mg IV    Emergency Department Course:  Nursing notes and vitals reviewed. ([id number]) I performed an exam of the patient as documented above.     IV inserted. Medicine administered as documented above. Blood drawn. Urine sample obtained. This was sent to the lab for further testing, results above.     Findings and plan explained to the Patient. Patient discharged home with instructions regarding supportive care, medications, and reasons to return. The importance of close follow-up was reviewed. The patient was prescribed Pepcid and omeprazole.    I personally reviewed the laboratory results with the Patient and answered all related questions prior to discharge.      Impression & Plan    Medical Decision Making:  Patient presents for epigastric pain and vomiting in the setting of chronic abdominal pain.  Evaluation today showed no signs of serious intraabdominal pathology.  Suspect that the doxcycline exacerbated her underlying abdominal pain and vomiting symptoms.  Patient has completed 10 days of doxycycline.  Rash and exposure hx (no travel, minimal outdoor activity, remembers bug bite and not tick bite, rash was present for 1 month) not suggestive of  lyme disease.  I discussed with the patient the option of stopping doxycycline given hx and negative lyme titer (though not perfectly sensitive- this was also discussed) vs finishing treatment with alternative abx.  Pt feels comfortable stopping doxycycline and declines further antibiotics based on lyme risk.  Patient also freqeuntly using aspirin for HA- advised to stop this in favor of tylenol. Likely gastritis vs PUD.  Hgb wnl.  Abdominal exam is otherwise benign therefore no signs perforated viscus, SBO, appendicitis or other intraabdominal obstructive or infectious process.    Instructions to patient:  Stop doxycycline.  Your stomach pain is likely due to inflammation of the stomach lining.  To treat this, medications are prescribed to decrease the amount of stomach acid in your stomach.    Take pepcid (famotidine) 20 mg 2x per day for 3 days and then stop.  At that same time, start taking omeprazole 40 mg daily and continue this for 15-30 days.  It is important to overlap these medications because starting omeprazole may temporarily increase stomach acid production.  Follow up with your primary care doctor to assess response to these treatments.  If these treatments are not effective, you may need further testing.  Stop taking aspirin, ibuprofen or aleve- these medications make your stomach worse.  Use maalox 15 mL every 4-6 hours as needed.    Diagnosis:    ICD-10-CM    1. Epigastric pain  R10.13    2. Vomiting, intractability of vomiting not specified, presence of nausea not specified, unspecified vomiting type  R11.10        Disposition:  discharged to home    Discharge Medications:  New Prescriptions    FAMOTIDINE (PEPCID) 20 MG TABLET    Take 1 tablet (20 mg) by mouth 2 times daily for 3 days    OMEPRAZOLE (PRILOSEC) 20 MG DR CAPSULE    Take 2 capsules (40 mg) by mouth daily       Elida Jackson  8/20/2020   [facility] EMERGENCY DEPARTMENT  Scribe Disclosure:  I, Manoj Harman, am serving as  a scribe at 11:36 PM on 8/20/2020 to document services personally performed by Candace Ulloa MD based on my observations and the provider's statements to me.        Candace Ulloa MD  08/21/20 2026

## 2020-08-21 NOTE — DISCHARGE INSTRUCTIONS
Stop doxycycline.  Your stomach pain is likely due to inflammation of the stomach lining.  To treat this, medications are prescribed to decrease the amount of stomach acid in your stomach.    Take pepcid (famotidine) 20 mg 2x per day for 3 days and then stop.  At that same time, start taking omeprazole 40 mg daily and continue this for 15-30 days.  It is important to overlap these medications because starting omeprazole may temporarily increase stomach acid production.  Follow up with your primary care doctor to assess response to these treatments.  If these treatments are not effective, you may need further testing.  Stop taking aspirin, ibuprofen or aleve- these medications make your stomach worse.  Use maalox 15 mL every 4-6 hours as needed.

## 2020-08-21 NOTE — ED TRIAGE NOTES
Patient presents to ED d/t multiple complaints   Has been vomiting every meal, for the past 2 weeks. Vomited black emesis this AM. C/o diarrhea, generalized weakness and fatigue.    States vomiting is chronic, but has been worse for the past 2 weeks which has changed.     ABC intact

## (undated) DEVICE — ESU PENCIL W/HOLSTER E2350H

## (undated) DEVICE — ENDO TROCAR OPTICAL ACCESS KII Z-THRD 12X100MM C0R85

## (undated) DEVICE — Device

## (undated) DEVICE — SOL NACL 0.9% IRRIG 3000ML BAG 2B7477

## (undated) DEVICE — PREP CHLORAPREP 26ML TINTED ORANGE  260815

## (undated) DEVICE — LINEN TOWEL PACK X5 5464

## (undated) DEVICE — ESU CORD MONOPOLAR 10'  E0510

## (undated) DEVICE — SU VICRYL 0 CT-2 CR 8X18" J727D

## (undated) DEVICE — BAG CLEAR TRASH 1.3M 39X33" P4040C

## (undated) DEVICE — SU VICRYL 4-0 P-3 18" UND J494G

## (undated) DEVICE — LINEN POUCH DBL 5427

## (undated) DEVICE — ENDO TROCAR FIRST ENTRY KII FIOS Z-THRD 05X100MM CTF03

## (undated) DEVICE — LINEN FULL SHEET 5511

## (undated) DEVICE — SUCTION CANISTER MEDIVAC LINER 3000ML W/LID 65651-530

## (undated) DEVICE — ENDO TROCAR SLEEVE KII Z-THREADED 05X100MM CTS02

## (undated) DEVICE — GLOVE PROTEXIS POWDER FREE 8.0 ORTHOPEDIC 2D73ET80

## (undated) DEVICE — GLOVE PROTEXIS POWDER FREE SMT 7.5  2D72PT75X

## (undated) DEVICE — ESU ELEC BLADE 2.75" COATED/INSULATED E1455

## (undated) DEVICE — ENDO POUCH UNIV RETRIEVAL SYSTEM INZII 10MM CD001

## (undated) DEVICE — ESU GROUND PAD ADULT W/CORD E7507

## (undated) DEVICE — LINEN HALF SHEET 5512

## (undated) DEVICE — SOL NACL 0.9% IRRIG 1000ML BOTTLE 2F7124

## (undated) DEVICE — DECANTER VIAL 2006S

## (undated) DEVICE — SUCTION IRR STRYKERFLOW II W/TIP 250-070-520

## (undated) RX ORDER — DEXAMETHASONE SODIUM PHOSPHATE 4 MG/ML
INJECTION, SOLUTION INTRA-ARTICULAR; INTRALESIONAL; INTRAMUSCULAR; INTRAVENOUS; SOFT TISSUE
Status: DISPENSED
Start: 2019-01-08

## (undated) RX ORDER — OXYCODONE HYDROCHLORIDE 5 MG/1
TABLET ORAL
Status: DISPENSED
Start: 2019-01-08

## (undated) RX ORDER — GLYCOPYRROLATE 0.2 MG/ML
INJECTION INTRAMUSCULAR; INTRAVENOUS
Status: DISPENSED
Start: 2019-01-08

## (undated) RX ORDER — PROPOFOL 10 MG/ML
INJECTION, EMULSION INTRAVENOUS
Status: DISPENSED
Start: 2019-01-08

## (undated) RX ORDER — FENTANYL CITRATE 50 UG/ML
INJECTION, SOLUTION INTRAMUSCULAR; INTRAVENOUS
Status: DISPENSED
Start: 2019-01-08

## (undated) RX ORDER — BUPIVACAINE HYDROCHLORIDE AND EPINEPHRINE 5; 5 MG/ML; UG/ML
INJECTION, SOLUTION EPIDURAL; INTRACAUDAL; PERINEURAL
Status: DISPENSED
Start: 2019-01-08

## (undated) RX ORDER — LIDOCAINE HYDROCHLORIDE 10 MG/ML
INJECTION, SOLUTION EPIDURAL; INFILTRATION; INTRACAUDAL; PERINEURAL
Status: DISPENSED
Start: 2019-01-08

## (undated) RX ORDER — PROMETHAZINE HYDROCHLORIDE 25 MG/ML
INJECTION, SOLUTION INTRAMUSCULAR; INTRAVENOUS
Status: DISPENSED
Start: 2019-01-08

## (undated) RX ORDER — LEVOFLOXACIN 5 MG/ML
INJECTION, SOLUTION INTRAVENOUS
Status: DISPENSED
Start: 2019-01-08

## (undated) RX ORDER — ONDANSETRON 2 MG/ML
INJECTION INTRAMUSCULAR; INTRAVENOUS
Status: DISPENSED
Start: 2019-01-08

## (undated) RX ORDER — NEOSTIGMINE METHYLSULFATE 1 MG/ML
VIAL (ML) INJECTION
Status: DISPENSED
Start: 2019-01-08

## (undated) RX ORDER — HYDROMORPHONE HYDROCHLORIDE 1 MG/ML
INJECTION, SOLUTION INTRAMUSCULAR; INTRAVENOUS; SUBCUTANEOUS
Status: DISPENSED
Start: 2019-01-08

## (undated) RX ORDER — EPHEDRINE SULFATE 50 MG/ML
INJECTION, SOLUTION INTRAVENOUS
Status: DISPENSED
Start: 2019-01-08